# Patient Record
Sex: MALE | Race: OTHER | HISPANIC OR LATINO | Employment: FULL TIME | URBAN - METROPOLITAN AREA
[De-identification: names, ages, dates, MRNs, and addresses within clinical notes are randomized per-mention and may not be internally consistent; named-entity substitution may affect disease eponyms.]

---

## 2021-05-18 ENCOUNTER — HOSPITAL ENCOUNTER (OUTPATIENT)
Dept: URGENT CARE | Facility: CLINIC | Age: 21
Discharge: HOME | End: 2021-05-18
Attending: NURSE PRACTITIONER

## 2023-01-21 ENCOUNTER — TELEPHONE (OUTPATIENT)
Dept: URGENT CARE | Facility: CLINIC | Age: 23
End: 2023-01-21

## 2023-01-21 PROCEDURE — 87529 HSV DNA AMP PROBE: CPT | Performed by: NURSE PRACTITIONER

## 2023-01-21 PROCEDURE — 87591 N.GONORRHOEAE DNA AMP PROB: CPT | Performed by: NURSE PRACTITIONER

## 2023-01-21 PROCEDURE — 87086 URINE CULTURE/COLONY COUNT: CPT | Performed by: NURSE PRACTITIONER

## 2023-01-21 PROCEDURE — 87491 CHLMYD TRACH DNA AMP PROBE: CPT | Performed by: NURSE PRACTITIONER

## 2023-01-21 NOTE — TELEPHONE ENCOUNTER
----- Message from WILLIE Ashley sent at 1/21/2023  4:55 PM EST -----  Please notify patient of negative gonorrhea and Chlamydia test results.

## 2023-01-22 ENCOUNTER — TELEPHONE (OUTPATIENT)
Dept: URGENT CARE | Facility: CLINIC | Age: 23
End: 2023-01-22
Payer: COMMERCIAL

## 2023-01-22 NOTE — TELEPHONE ENCOUNTER
----- Message from WILLIE Gonzalez sent at 1/22/2023  5:28 PM EST -----  Please call the patient regarding his normal result.    Please let patient know that his urine culture showed no growth which is negative for urinary tract infection.    If he continues to have symptoms or any other concerns persist he should follow-up with a primary care provider.

## 2023-01-23 ENCOUNTER — TELEPHONE (OUTPATIENT)
Dept: URGENT CARE | Facility: CLINIC | Age: 23
End: 2023-01-23
Payer: COMMERCIAL

## 2023-01-23 NOTE — TELEPHONE ENCOUNTER
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

## 2023-02-17 ENCOUNTER — OFFICE VISIT (OUTPATIENT)
Dept: FAMILY MEDICINE CLINIC | Facility: CLINIC | Age: 23
End: 2023-02-17
Payer: COMMERCIAL

## 2023-02-17 ENCOUNTER — LAB (OUTPATIENT)
Dept: LAB | Facility: HOSPITAL | Age: 23
End: 2023-02-17
Payer: COMMERCIAL

## 2023-02-17 VITALS
SYSTOLIC BLOOD PRESSURE: 132 MMHG | HEART RATE: 81 BPM | OXYGEN SATURATION: 95 % | WEIGHT: 146 LBS | BODY MASS INDEX: 24.3 KG/M2 | DIASTOLIC BLOOD PRESSURE: 66 MMHG

## 2023-02-17 DIAGNOSIS — Z13.29 SCREENING FOR THYROID DISORDER: ICD-10-CM

## 2023-02-17 DIAGNOSIS — Z13.6 ENCOUNTER FOR LIPID SCREENING FOR CARDIOVASCULAR DISEASE: ICD-10-CM

## 2023-02-17 DIAGNOSIS — Z13.220 ENCOUNTER FOR LIPID SCREENING FOR CARDIOVASCULAR DISEASE: ICD-10-CM

## 2023-02-17 DIAGNOSIS — Z13.29 SCREENING FOR THYROID DISORDER: Primary | ICD-10-CM

## 2023-02-17 DIAGNOSIS — Z01.89 ROUTINE LAB DRAW: ICD-10-CM

## 2023-02-17 DIAGNOSIS — B00.9 HSV-2 INFECTION: ICD-10-CM

## 2023-02-17 LAB
ALBUMIN SERPL-MCNC: 4.6 G/DL (ref 3.5–5.2)
ALBUMIN/GLOB SERPL: 1.8 G/DL
ALP SERPL-CCNC: 101 U/L (ref 39–117)
ALT SERPL W P-5'-P-CCNC: 34 U/L (ref 1–41)
ANION GAP SERPL CALCULATED.3IONS-SCNC: 7 MMOL/L (ref 5–15)
AST SERPL-CCNC: 29 U/L (ref 1–40)
BASOPHILS # BLD AUTO: 0.04 10*3/MM3 (ref 0–0.2)
BASOPHILS NFR BLD AUTO: 0.4 % (ref 0–1.5)
BILIRUB SERPL-MCNC: 0.2 MG/DL (ref 0–1.2)
BUN SERPL-MCNC: 13 MG/DL (ref 6–20)
BUN/CREAT SERPL: 14.3 (ref 7–25)
CALCIUM SPEC-SCNC: 9.4 MG/DL (ref 8.6–10.5)
CHLORIDE SERPL-SCNC: 104 MMOL/L (ref 98–107)
CHOLEST SERPL-MCNC: 162 MG/DL (ref 0–200)
CO2 SERPL-SCNC: 29 MMOL/L (ref 22–29)
CREAT SERPL-MCNC: 0.91 MG/DL (ref 0.76–1.27)
DEPRECATED RDW RBC AUTO: 40.2 FL (ref 37–54)
EGFRCR SERPLBLD CKD-EPI 2021: 121.5 ML/MIN/1.73
EOSINOPHIL # BLD AUTO: 0.15 10*3/MM3 (ref 0–0.4)
EOSINOPHIL NFR BLD AUTO: 1.5 % (ref 0.3–6.2)
ERYTHROCYTE [DISTWIDTH] IN BLOOD BY AUTOMATED COUNT: 12.7 % (ref 12.3–15.4)
GLOBULIN UR ELPH-MCNC: 2.5 GM/DL
GLUCOSE SERPL-MCNC: 94 MG/DL (ref 65–99)
HCT VFR BLD AUTO: 42.9 % (ref 37.5–51)
HDLC SERPL-MCNC: 41 MG/DL (ref 40–60)
HGB BLD-MCNC: 15.2 G/DL (ref 13–17.7)
IMM GRANULOCYTES # BLD AUTO: 0.05 10*3/MM3 (ref 0–0.05)
IMM GRANULOCYTES NFR BLD AUTO: 0.5 % (ref 0–0.5)
LDLC SERPL CALC-MCNC: 86 MG/DL (ref 0–100)
LDLC/HDLC SERPL: 1.95 {RATIO}
LYMPHOCYTES # BLD AUTO: 2.11 10*3/MM3 (ref 0.7–3.1)
LYMPHOCYTES NFR BLD AUTO: 21.2 % (ref 19.6–45.3)
MCH RBC QN AUTO: 31 PG (ref 26.6–33)
MCHC RBC AUTO-ENTMCNC: 35.4 G/DL (ref 31.5–35.7)
MCV RBC AUTO: 87.4 FL (ref 79–97)
MONOCYTES # BLD AUTO: 0.71 10*3/MM3 (ref 0.1–0.9)
MONOCYTES NFR BLD AUTO: 7.1 % (ref 5–12)
NEUTROPHILS NFR BLD AUTO: 6.89 10*3/MM3 (ref 1.7–7)
NEUTROPHILS NFR BLD AUTO: 69.3 % (ref 42.7–76)
NRBC BLD AUTO-RTO: 0 /100 WBC (ref 0–0.2)
PLATELET # BLD AUTO: 299 10*3/MM3 (ref 140–450)
PMV BLD AUTO: 11.1 FL (ref 6–12)
POTASSIUM SERPL-SCNC: 3.8 MMOL/L (ref 3.5–5.2)
PROT SERPL-MCNC: 7.1 G/DL (ref 6–8.5)
RBC # BLD AUTO: 4.91 10*6/MM3 (ref 4.14–5.8)
SODIUM SERPL-SCNC: 140 MMOL/L (ref 136–145)
TRIGL SERPL-MCNC: 206 MG/DL (ref 0–150)
TSH SERPL DL<=0.05 MIU/L-ACNC: 0.97 UIU/ML (ref 0.27–4.2)
VLDLC SERPL-MCNC: 35 MG/DL (ref 5–40)
WBC NRBC COR # BLD: 9.95 10*3/MM3 (ref 3.4–10.8)

## 2023-02-17 PROCEDURE — 85025 COMPLETE CBC W/AUTO DIFF WBC: CPT

## 2023-02-17 PROCEDURE — 80061 LIPID PANEL: CPT

## 2023-02-17 PROCEDURE — 84443 ASSAY THYROID STIM HORMONE: CPT

## 2023-02-17 PROCEDURE — 80053 COMPREHEN METABOLIC PANEL: CPT

## 2023-02-17 PROCEDURE — 36415 COLL VENOUS BLD VENIPUNCTURE: CPT

## 2023-02-17 PROCEDURE — 99385 PREV VISIT NEW AGE 18-39: CPT

## 2023-02-17 RX ORDER — VALACYCLOVIR HYDROCHLORIDE 500 MG/1
500 TABLET, FILM COATED ORAL 2 TIMES DAILY
Qty: 10 TABLET | Refills: 0 | Status: SHIPPED | OUTPATIENT
Start: 2023-02-17 | End: 2023-02-22

## 2023-02-20 NOTE — PROGRESS NOTES
"Chief Complaint  Annual Exam (Patient states he has not been to see a provider since he was 17)    SUBJECTIVE  Trav Salmeron presents to [facility]    History of Present Illness  22-year-old Trav Salmeron presents today to establish care and for a annual physical.    Patient states that he is following up from an urgent care visit where he was diagnosed with herpes simplex 2.  Patient states that he still has a nikolas from a lesion on his penis that he is concerned about.  Is not hurting or itching at this time.  Patient would like to have a refill on antiviral medicine to help when symptoms do present again.  He wanted to discuss options for sexual intimacy.  States that he is currently monogamous with a girlfriend and wanted to know the chances of transmitting herpes to her.  We did discuss that it is important to disclose this information to her to give her the choice.  Patient states he understands.  He has no other concerns at this time.    Past Medical History:   Diagnosis Date   • Allergies       History reviewed. No pertinent family history.   History reviewed. No pertinent surgical history.     Current Outpatient Medications:   •  valACYclovir (Valtrex) 500 MG tablet, Take 1 tablet by mouth 2 (Two) Times a Day for 5 days., Disp: 10 tablet, Rfl: 0    OBJECTIVE  Vital Signs:   /66 (BP Location: Left arm)   Pulse 81   Wt 66.2 kg (146 lb)   SpO2 95%   BMI 24.30 kg/m²    Estimated body mass index is 24.3 kg/m² as calculated from the following:    Height as of 1/21/23: 165.1 cm (65\").    Weight as of this encounter: 66.2 kg (146 lb).     Wt Readings from Last 3 Encounters:   02/17/23 66.2 kg (146 lb)   01/21/23 64 kg (141 lb)   01/01/22 59.6 kg (131 lb 4.8 oz)     BP Readings from Last 3 Encounters:   02/17/23 132/66   01/21/23 127/71   01/01/22 116/61       Physical Exam  Vitals and nursing note reviewed.   Constitutional:       Appearance: Normal appearance. "   HENT:      Head: Normocephalic and atraumatic.      Nose: Nose normal.      Mouth/Throat:      Mouth: Mucous membranes are moist.   Eyes:      Conjunctiva/sclera: Conjunctivae normal.      Pupils: Pupils are equal, round, and reactive to light.   Cardiovascular:      Rate and Rhythm: Normal rate and regular rhythm.      Pulses: Normal pulses.      Heart sounds: Normal heart sounds.   Pulmonary:      Effort: Pulmonary effort is normal.      Breath sounds: Normal breath sounds.   Abdominal:      General: Abdomen is flat.      Palpations: Abdomen is soft.   Musculoskeletal:         General: Normal range of motion.      Cervical back: Normal range of motion and neck supple.   Skin:     General: Skin is warm and dry.   Neurological:      General: No focal deficit present.      Mental Status: He is alert and oriented to person, place, and time. Mental status is at baseline.   Psychiatric:         Mood and Affect: Mood normal.         Behavior: Behavior normal.         Thought Content: Thought content normal.         Judgment: Judgment normal.          Result Review    Common labs    Common Labs 2/17/23 2/17/23 2/17/23    1623 1623 1623   Glucose  94    BUN  13    Creatinine  0.91    Sodium  140    Potassium  3.8    Chloride  104    Calcium  9.4    Albumin  4.6    Total Bilirubin  0.2    Alkaline Phosphatase  101    AST (SGOT)  29    ALT (SGPT)  34    WBC 9.95     Hemoglobin 15.2     Hematocrit 42.9     Platelets 299     Total Cholesterol   162   Triglycerides   206 (A)   HDL Cholesterol   41   LDL Cholesterol    86   (A) Abnormal value            CMP    CMP 2/17/23   Glucose 94   BUN 13   Creatinine 0.91   eGFR 121.5   Sodium 140   Potassium 3.8   Chloride 104   Calcium 9.4   Total Protein 7.1   Albumin 4.6   Globulin 2.5   Total Bilirubin 0.2   Alkaline Phosphatase 101   AST (SGOT) 29   ALT (SGPT) 34   Albumin/Globulin Ratio 1.8   BUN/Creatinine Ratio 14.3   Anion Gap 7.0           CBC    CBC 2/17/23   WBC 9.95   RBC  4.91   Hemoglobin 15.2   Hematocrit 42.9   MCV 87.4   MCH 31.0   MCHC 35.4   RDW 12.7   Platelets 299           CBC w/diff    CBC w/Diff 2/17/23   WBC 9.95   RBC 4.91   Hemoglobin 15.2   Hematocrit 42.9   MCV 87.4   MCH 31.0   MCHC 35.4   RDW 12.7   Platelets 299   Neutrophil Rel % 69.3   Immature Granulocyte Rel % 0.5   Lymphocyte Rel % 21.2   Monocyte Rel % 7.1   Eosinophil Rel % 1.5   Basophil Rel % 0.4           Lipid Panel    Lipid Panel 2/17/23   Total Cholesterol 162   Triglycerides 206 (A)   HDL Cholesterol 41   VLDL Cholesterol 35   LDL Cholesterol  86   LDL/HDL Ratio 1.95   (A) Abnormal value            TSH    TSH 2/17/23   TSH 0.974           Electrolytes    Electrolytes 2/17/23   Sodium 140   Potassium 3.8   Chloride 104   Calcium 9.4           Renal Profile    Renal Profile 2/17/23   BUN 13   Creatinine 0.91           BMP    BMP 2/17/23   BUN 13   Creatinine 0.91   Sodium 140   Potassium 3.8   Chloride 104   CO2 29.0   Calcium 9.4                           Lab Results   Component Value Date    COLORU Orange (A) 01/21/2023    CLARITYU Cloudy (A) 01/21/2023    PHUR 7.0 01/21/2023    KETONESU Negative 01/21/2023    BILIRUBINUR Negative 01/21/2023    LEUKOCYTESUR Negative 01/21/2023    UROBILINOGEN 1 E.U./dL (A) 01/21/2023       No Images in the past 120 days found..      The above data has been reviewed by WILLIE Villegas 02/17/2023 10:09 EST.          Patient Care Team:  Katey Dickinson APRN as PCP - General (Emergency Medicine)    BMI is within normal parameters. No other follow-up for BMI required.       ASSESSMENT & PLAN    Diagnoses and all orders for this visit:    1. Screening for thyroid disorder (Primary)  Comments:  He will have done with labs.  Orders:  -     TSH; Future    2. HSV-2 infection  Comments:  I have ordered the patient Valtrex.    3. Encounter for lipid screening for cardiovascular disease  Comments:  He will have done with labs.  Orders:  -     Lipid Panel; Future    4.  Routine lab draw  -     Comprehensive Metabolic Panel; Future  -     CBC & Differential; Future    Other orders  -     valACYclovir (Valtrex) 500 MG tablet; Take 1 tablet by mouth 2 (Two) Times a Day for 5 days.  Dispense: 10 tablet; Refill: 0         Tobacco Use: Low Risk    • Smoking Tobacco Use: Never   • Smokeless Tobacco Use: Never   • Passive Exposure: Never       Follow Up     No follow-ups on file.      Patient was given instructions and counseling regarding his condition or for health maintenance advice. Please see specific information pulled into the AVS if appropriate.   I have reviewed information obtained and documented by others and I have confirmed the accuracy of this documented note.    WILLIE Villegas   Trav Salmeron is a 22 y.o. male and is here for a comprehensive physical exam. The patient reports problems - bumps on penis. .    Do you take any herbs or supplements that were not prescribed by a doctor? no  Are you taking calcium supplements? no  Are you taking aspirin daily? no     History:  Patient receives prostate care outside our clinic  Date last prostate exam: none  Date last PSA: none    The following portions of the patient's history were reviewed and updated as appropriate: allergies, current medications, past family history, past medical history, past social history, past surgical history and problem list.    Review of Systems  Do you have pain that bothers you in your daily life? no  A comprehensive review of systems was negative.    Objective   /66 (BP Location: Left arm)   Pulse 81   Wt 66.2 kg (146 lb)   SpO2 95%   BMI 24.30 kg/m²     General Appearance:    Alert, cooperative, no distress, appears stated age   Head:    Normocephalic, without obvious abnormality, atraumatic   Eyes:    PERRL, conjunctiva/corneas clear, EOM's intact, fundi     benign, both eyes        Ears:    Normal TM's and external ear canals, both ears   Nose:   Nares  normal, septum midline, mucosa normal, no drainage    or sinus tenderness   Throat:   Lips, mucosa, and tongue normal; teeth and gums normal   Neck:   Supple, symmetrical, trachea midline, no adenopathy;        thyroid:  No enlargement/tenderness/nodules; no carotid    bruit or JVD   Back:     Symmetric, no curvature, ROM normal, no CVA tenderness   Lungs:     Clear to auscultation bilaterally, respirations unlabored   Chest wall:    No tenderness or deformity   Heart:    Regular rate and rhythm, S1 and S2 normal, no murmur, rub   or gallop   Abdomen:     Soft, non-tender, bowel sounds active all four quadrants,     no masses, no organomegaly   Genitalia:    Normal male without lesion, discharge or tenderness   Rectal:    Normal tone, normal prostate, no masses or tenderness;    guaiac negative stool   Extremities:   Extremities normal, atraumatic, no cyanosis or edema   Pulses:   2+ and symmetric all extremities   Skin:   Skin color, texture, turgor normal, no rashes or lesions   Lymph nodes:   Cervical, supraclavicular, and axillary nodes normal   Neurologic:   CNII-XII intact. Normal strength, sensation and reflexes       throughout           History reviewed. No pertinent surgical history.   History reviewed. No pertinent family history.     Current Outpatient Medications:   •  valACYclovir (Valtrex) 500 MG tablet, Take 1 tablet by mouth 2 (Two) Times a Day for 5 days., Disp: 10 tablet, Rfl: 0   Assessment and Plan  Diagnoses and all orders for this visit:    1. Screening for thyroid disorder (Primary)  Comments:  He will have done with labs.  Orders:  -     TSH; Future    2. HSV-2 infection  Comments:  I have ordered the patient Valtrex.    3. Encounter for lipid screening for cardiovascular disease  Comments:  He will have done with labs.  Orders:  -     Lipid Panel; Future    4. Routine lab draw  -     Comprehensive Metabolic Panel; Future  -     CBC & Differential; Future    Other orders  -     valACYclovir  (Valtrex) 500 MG tablet; Take 1 tablet by mouth 2 (Two) Times a Day for 5 days.  Dispense: 10 tablet; Refill: 0      Healthy male exam.   Screening for thyroid disorder [Z13.29]     1.   2. Patient Counseling:  --Nutrition: Stressed importance of moderation in sodium/caffeine intake, saturated fat and cholesterol, caloric balance, sufficient intake of fresh fruits, vegetables, fiber, calcium, iron, and 1 mg of folate supplement per day (for females capable of pregnancy).  --Discussed the issue of estrogen replacement, calcium supplement, and the daily use of baby aspirin.  --Exercise: Stressed the importance of regular exercise.   --Substance Abuse: Discussed cessation/primary prevention of tobacco, alcohol, or other drug use; driving or other dangerous activities under the influence; availability of treatment for abuse.    --Sexuality: Discussed sexually transmitted diseases, partner selection, use of condoms, avoidance of unintended pregnancy  and contraceptive alternatives.   --Injury prevention: Discussed safety belts, safety helmets, smoke detector, smoking near bedding or upholstery.   --Dental health: Discussed importance of regular tooth brushing, flossing, and dental visits.  --Immunizations reviewed.      3. Discussed the patient's BMI with him.  The BMI is in the acceptable range  4. Follow up in one year      The patient is advised to reduce exposure to stress, continue current medications, continue current healthy lifestyle patterns and return for routine annual checkups.

## 2023-02-20 NOTE — PROGRESS NOTES
Triglycerides are elevated at 206. This can be reduced with diet and exercise.  No other labs concerning at this time.

## 2023-02-24 ENCOUNTER — TELEPHONE (OUTPATIENT)
Dept: URGENT CARE | Facility: CLINIC | Age: 23
End: 2023-02-24
Payer: COMMERCIAL

## 2023-02-24 NOTE — TELEPHONE ENCOUNTER
Caller: Trav Leach    Relationship: Self    Best call back number: [phone]    What is the best time to reach you: ANY    Who are you requesting to speak with (clinical staff, provider,  specific staff member): CLINICAL    What was the call regarding: PATIENT STATED THAT HE HAS COMPLETED MEDICATION FOR HPV1 AND IS HAVING ANOTHER BREAK OUT IN THE LAST 24 HOURS,  HE IS REQUESTING CALL TO DISCUSS PRIOR TO TAKING MEDICATION AGAIN.     Do you require a callback: YES

## 2023-02-27 DIAGNOSIS — B00.9 HSV-2 (HERPES SIMPLEX VIRUS 2) INFECTION: Primary | ICD-10-CM

## 2023-02-27 RX ORDER — ACYCLOVIR 400 MG/1
400 TABLET ORAL
Qty: 50 TABLET | Refills: 5 | Status: SHIPPED | OUTPATIENT
Start: 2023-02-27 | End: 2023-03-09

## 2023-11-21 ENCOUNTER — OFFICE VISIT (OUTPATIENT)
Dept: FAMILY MEDICINE CLINIC | Facility: CLINIC | Age: 23
End: 2023-11-21
Payer: COMMERCIAL

## 2023-11-21 VITALS
HEART RATE: 91 BPM | OXYGEN SATURATION: 97 % | BODY MASS INDEX: 26.42 KG/M2 | SYSTOLIC BLOOD PRESSURE: 109 MMHG | HEIGHT: 65 IN | WEIGHT: 158.6 LBS | DIASTOLIC BLOOD PRESSURE: 55 MMHG

## 2023-11-21 DIAGNOSIS — Z76.89 ESTABLISHING CARE WITH NEW DOCTOR, ENCOUNTER FOR: Primary | ICD-10-CM

## 2023-11-21 DIAGNOSIS — Z23 NEED FOR INFLUENZA VACCINATION: ICD-10-CM

## 2023-11-21 RX ORDER — ERYTHROMYCIN 5 MG/G
OINTMENT OPHTHALMIC
COMMUNITY
End: 2023-11-21

## 2023-11-21 NOTE — PROGRESS NOTES
"    Subjective:       Trav Salmeron is a 23 y.o. male who presents to [facility].     Mr. Salmeron is otherwise healthy and has no complaints today.     We discussed care gaps and he will receive a flu shot today.     Can follow up next year for next physical.     The following portions of the patient's history were reviewed and updated as appropriate: allergies, current medications, past family history, past medical history, past social history, past surgical history, and problem list.    Past Medical Hx:  Past Medical History:   Diagnosis Date    Allergies        Past Surgical Hx:  History reviewed. No pertinent surgical history.    Current Meds:  No current outpatient medications on file.    Allergies:  No Known Allergies    Family Hx:  History reviewed. No pertinent family history.     Social History:  Social History     Socioeconomic History    Marital status: Single   Tobacco Use    Smoking status: Never     Passive exposure: Never    Smokeless tobacco: Never   Vaping Use    Vaping Use: Never used   Substance and Sexual Activity    Alcohol use: Never    Drug use: Never    Sexual activity: Never       Review of Systems  Review of Systems   Constitutional:  Negative for chills.   Respiratory:  Negative for shortness of breath.    Gastrointestinal:  Negative for nausea and vomiting.   Genitourinary:  Negative for dysuria and genital sores.   Neurological:  Negative for dizziness, weakness and light-headedness.       Objective:     /55   Pulse 91   Ht 165.1 cm (65\")   Wt 71.9 kg (158 lb 9.6 oz)   SpO2 97%   BMI 26.39 kg/m²   Physical Exam  Constitutional:       General: He is not in acute distress.     Appearance: Normal appearance. He is not ill-appearing, toxic-appearing or diaphoretic.   HENT:      Head: Normocephalic and atraumatic.   Eyes:      Extraocular Movements: Extraocular movements intact.      Pupils: Pupils are equal, round, and reactive to light.   Cardiovascular:      Rate and " Rhythm: Normal rate and regular rhythm.   Pulmonary:      Effort: Pulmonary effort is normal. No respiratory distress.      Breath sounds: Normal breath sounds. No stridor. No wheezing, rhonchi or rales.   Abdominal:      General: Bowel sounds are normal. There is no distension.      Tenderness: There is no abdominal tenderness.   Musculoskeletal:      Cervical back: Normal range of motion and neck supple.   Lymphadenopathy:      Cervical: No cervical adenopathy.   Skin:     General: Skin is warm and dry.      Coloration: Skin is not jaundiced.   Neurological:      Mental Status: He is alert.      Motor: No weakness.   Psychiatric:         Mood and Affect: Mood normal.         Behavior: Behavior normal.          Assessment/Plan:     Diagnoses and all orders for this visit:    1. Establishing care with new doctor, encounter for (Primary)    Mr. Salmeron is otherwise healthy and has no complaints today.       Can follow up next year for next physical.     2. Need for influenza vaccination    We discussed care gaps and he will receive a flu shot today.     -     Fluzone >6 Months (2622-7811)          Rx changes: None    Follow-up:     Return in about 1 year (around 11/21/2024) for Annual physical.    Preventative:  Health Maintenance   Topic Date Due    COVID-19 Vaccine (1) Never done    TDAP/TD VACCINES (1 - Tdap) Never done    HEPATITIS C SCREENING  Never done    ANNUAL PHYSICAL  Never done    INFLUENZA VACCINE  Never done    Pneumococcal Vaccine 0-64  Aged Out           This document has been electronically signed by Ti Duarte MD on November 21, 2023 15:18 EST       Parts of this note are electronic transcriptions/translations of spoken language to printed text using the Dragon Dictation system.

## 2024-01-22 RX ORDER — VALACYCLOVIR HYDROCHLORIDE 500 MG/1
TABLET, FILM COATED ORAL
Qty: 10 TABLET | Refills: 0 | Status: SHIPPED | OUTPATIENT
Start: 2024-01-22

## 2024-01-22 NOTE — TELEPHONE ENCOUNTER
Caller: Trav Leach    Relationship: Self    Best call back number: [phone]     Who are you requesting to speak with (clinical staff, provider,  specific staff member): DR GARZA    What was the call regarding: PATIENT STATES THAT  HE WOULD LIKE FOR THIS PRESCRIPTION TO BE FILLED BY DR GARZA      PATIENT STATES THAT HE WOULD LIKE A CALL OR TEXT TO LET HIM KNOW IF THIS WILL BE FILLED.

## 2024-01-22 NOTE — TELEPHONE ENCOUNTER
Previous CT patient, established care with KR on 11/21/23.  Next f/u appt 11/22/24  Last Rx per [facility] 2/17/23 for #20/0

## 2024-01-22 NOTE — TELEPHONE ENCOUNTER
I called patient:  He states this is his second flare since his diagnosis over a year ago and is comfortable staying with current treatment plan.  I encouraged patient to contact office for increase to daily therapy if he started having more frequent flares/outbreaks.  Patient states understanding.

## 2024-05-30 RX ORDER — VALACYCLOVIR HYDROCHLORIDE 500 MG/1
500 TABLET, FILM COATED ORAL DAILY
Qty: 10 TABLET | Refills: 0 | Status: SHIPPED | OUTPATIENT
Start: 2024-05-30

## 2024-05-30 NOTE — TELEPHONE ENCOUNTER
Caller: Trav Leach    Relationship: Self    Best call back number: [phone]     Requested Prescriptions:   Requested Prescriptions     Pending Prescriptions Disp Refills    valACYclovir (VALTREX) 500 MG tablet 10 tablet 0        Pharmacy where request should be sent: [id number] - [place], [place] - [address] AT [facility] [phone] [facility] [phone]      Last office visit with prescribing clinician: 11/21/2023   Last telemedicine visit with prescribing clinician: Visit date not found   Next office visit with prescribing clinician: 11/22/2024     Additional details provided by patient: PATIENT IS COMPLETELY OUT OF MEDICATION. PATIENT IS REQUESTING A DAILY DOSE PRESCRIPTION.     Does the patient have less than a 3 day supply:  [x] Yes  [] No    Would you like a call back once the refill request has been completed: [x] Yes [] No    If the office needs to give you a call back, can they leave a voicemail: [x] Yes [] No    Kevin Mccoy Rep   05/30/24 14:49 EDT

## 2024-08-13 ENCOUNTER — TELEPHONE (OUTPATIENT)
Dept: FAMILY MEDICINE CLINIC | Facility: CLINIC | Age: 24
End: 2024-08-13
Payer: COMMERCIAL

## 2024-08-13 NOTE — TELEPHONE ENCOUNTER
Called patient: he is requesting psych referral to Monet Jiménez for anxiety and depression.  Patient established care 11/21/23, has not been seen in the clinic since then and has no follow up appt scheduled.  There is no mention of anxiety or depression in that first office note.  Would you like to have patient schedule appt before getting psych referral?

## 2024-08-13 NOTE — TELEPHONE ENCOUNTER
Patient called and is wondering if he could get a referral to Monet Jiménez. The phone number is [phone]. Her location is [address] [place] [address] [place] [address]

## 2024-08-15 DIAGNOSIS — F41.9 ANXIETY AND DEPRESSION: Primary | ICD-10-CM

## 2024-08-15 DIAGNOSIS — F32.A ANXIETY AND DEPRESSION: Primary | ICD-10-CM

## 2024-08-15 NOTE — TELEPHONE ENCOUNTER
This is what I would recommend: Before scheduling an appointment, I would just like to personally talk with the patient and ask about his history of depression and anxiety.  I would feel comfortable to treat this with medication if he would prefer to avoid seeing a specialist.  However, if he specifically wants to be seen first by psych I can of course refer him but I am also comfortable to treat him as well if that is what he prefers.  Because we have not had an opportunity to talk about any of this history before, I would like to talk to them personally to find out more about this.  However, this does not have to be a video visit.  We could simply be scheduled for a time to speak to each other over the phone.  Can we reach out to the patient and set up a time for us to speak together?  I tried to call him personally this morning and was not able to get through as it went straight to voicemail.    Thank you very much,    Ti Duarte      This document has been electronically signed by Ti Duarte MD on August 15, 2024 09:39 EDT

## 2024-08-15 NOTE — TELEPHONE ENCOUNTER
Dr. Duarte - please call patient around 4:00 today      - please cancel appt because he is just going to call the patient, not do a visit.

## 2024-08-15 NOTE — TELEPHONE ENCOUNTER
I called the patient back and spoke with him personally over the phone for several minutes.  Lately, he has been dealing with depression and anxiety.  He declines any medical therapy for me for this and prefers counseling only.  He would specifically like to see psychiatrist Monet Jiménez because he has some friends who have seen her and had very good results.  I told him I would be happy to treat him for depression and anxiety if he wishes and have a visit for this purpose but if it is his preference for referral I can also place that as well for cognitive behavioral therapy.  He expressed a preference to avoid medication for me and to be referred to Monet Jiménez today.  I have placed this referral today.      This document has been electronically signed by Ti Duarte MD on August 15, 2024 16:07 EDT

## 2024-08-15 NOTE — TELEPHONE ENCOUNTER
Patient is scheduled today @ 4 pm. He would prefer to have a telephone call not a MyChart Video Visit

## 2024-08-29 RX ORDER — VALACYCLOVIR HYDROCHLORIDE 500 MG/1
500 TABLET, FILM COATED ORAL DAILY
Qty: 10 TABLET | Refills: 3 | Status: SHIPPED | OUTPATIENT
Start: 2024-08-29

## 2024-08-29 NOTE — TELEPHONE ENCOUNTER
Caller: Trav Leach    Relationship: Self    Best call back number: [phone]    Requested Prescriptions:   Requested Prescriptions     Pending Prescriptions Disp Refills    valACYclovir (VALTREX) 500 MG tablet 10 tablet 0     Sig: Take 1 tablet by mouth Daily.        Pharmacy where request should be sent: [id number] - [place], [place] - [address] AT [facility] [phone] [facility] [phone]      Last office visit with prescribing clinician: 11/21/2023   Last telemedicine visit with prescribing clinician: Visit date not found   Next office visit with prescribing clinician: 11/22/2024     Additional details provided by patient: PATIENT IS CURRENTLY OUT OF THIS MEDICATION.     Does the patient have less than a 3 day supply:  [x] Yes  [] No    Would you like a call back once the refill request has been completed: [] Yes [x] No    If the office needs to give you a call back, can they leave a voicemail: [] Yes [x] No    Kevin Bailey Rep   08/29/24 13:31 EDT

## 2024-09-30 ENCOUNTER — OFFICE VISIT (OUTPATIENT)
Dept: FAMILY MEDICINE CLINIC | Facility: CLINIC | Age: 24
End: 2024-09-30
Payer: COMMERCIAL

## 2024-09-30 ENCOUNTER — LAB (OUTPATIENT)
Dept: LAB | Facility: HOSPITAL | Age: 24
End: 2024-09-30
Payer: COMMERCIAL

## 2024-09-30 VITALS
OXYGEN SATURATION: 96 % | HEIGHT: 65 IN | BODY MASS INDEX: 25.66 KG/M2 | TEMPERATURE: 99.5 F | WEIGHT: 154 LBS | HEART RATE: 115 BPM | SYSTOLIC BLOOD PRESSURE: 150 MMHG | DIASTOLIC BLOOD PRESSURE: 88 MMHG

## 2024-09-30 DIAGNOSIS — R05.9 COUGH, UNSPECIFIED TYPE: ICD-10-CM

## 2024-09-30 DIAGNOSIS — R50.9 FEVER, UNSPECIFIED FEVER CAUSE: ICD-10-CM

## 2024-09-30 DIAGNOSIS — Z20.2 POTENTIAL EXPOSURE TO STD: Primary | ICD-10-CM

## 2024-09-30 DIAGNOSIS — R45.89 DEPRESSED MOOD: ICD-10-CM

## 2024-09-30 DIAGNOSIS — Z20.2 POTENTIAL EXPOSURE TO STD: ICD-10-CM

## 2024-09-30 DIAGNOSIS — G44.83 PRIMARY COUGH HEADACHE: ICD-10-CM

## 2024-09-30 DIAGNOSIS — J02.9 SORE THROAT: ICD-10-CM

## 2024-09-30 LAB
EXPIRATION DATE: NORMAL
EXPIRATION DATE: NORMAL
FLUAV AG UPPER RESP QL IA.RAPID: NOT DETECTED
FLUBV AG UPPER RESP QL IA.RAPID: NOT DETECTED
HCV AB SER QL: NORMAL
HETEROPH AB SER QL LA: NEGATIVE
HIV 1+2 AB+HIV1 P24 AG SERPL QL IA: NORMAL
INTERNAL CONTROL: NORMAL
INTERNAL CONTROL: NORMAL
Lab: NORMAL
Lab: NORMAL
S PYO AG THROAT QL: NEGATIVE
SARS-COV-2 AG UPPER RESP QL IA.RAPID: NOT DETECTED

## 2024-09-30 PROCEDURE — 36415 COLL VENOUS BLD VENIPUNCTURE: CPT

## 2024-09-30 PROCEDURE — 86592 SYPHILIS TEST NON-TREP QUAL: CPT

## 2024-09-30 PROCEDURE — 99213 OFFICE O/P EST LOW 20 MIN: CPT | Performed by: STUDENT IN AN ORGANIZED HEALTH CARE EDUCATION/TRAINING PROGRAM

## 2024-09-30 PROCEDURE — 87880 STREP A ASSAY W/OPTIC: CPT | Performed by: STUDENT IN AN ORGANIZED HEALTH CARE EDUCATION/TRAINING PROGRAM

## 2024-09-30 PROCEDURE — 86308 HETEROPHILE ANTIBODY SCREEN: CPT

## 2024-09-30 PROCEDURE — G0432 EIA HIV-1/HIV-2 SCREEN: HCPCS

## 2024-09-30 PROCEDURE — 87428 SARSCOV & INF VIR A&B AG IA: CPT | Performed by: STUDENT IN AN ORGANIZED HEALTH CARE EDUCATION/TRAINING PROGRAM

## 2024-09-30 PROCEDURE — 86803 HEPATITIS C AB TEST: CPT

## 2024-09-30 NOTE — PROGRESS NOTES
Subjective:       Trav Salmeron is a 24 y.o. male who presents for potential exposure to STD.     Mr. Salmeron endorses nonspecific symptoms of headache and sore throat for approximately one month.     Due to his current symptoms, he was tested for COVID and Flu as well as strep throat prior to our encounter. These tests were negative.           Mr. Salmeron tells me that he had a new sexual partner one month ago and did not use a condom. He has been reading about his symptoms and is concerned about the possibility he was exposed to an STD, such as HIV.     Mr. Salmeron is afebrile for us though his temperature is 99.5 F. His HR, while initially elevated at 115, improved on my physical exam, suggesting that this initial elevation might have been due to anxiousness. This would also fit with the elevation in his BP of 150/88, as his BP was normal at 116/67 at last visit. On physical exam, he is anxious appearing but not toxic appearing, and his lungs are clear to auscultation.     He requests testing for HIV and syphilis today, and I would also screen him for Hepatitis C. Would order a monospot as well and see back in six weeks.     Mr. Salmeron is also anxious and somber today. 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 That, coupled with his anxiousness over potential exposure to STD, has left him feeling anxious and depressed. We discussed treatment options today, including medications and counseling. He prefers to start with referral to Behavioral Health for counseling today.         Past Medical Hx:  Past Medical History:   Diagnosis Date    Allergies        Past Surgical Hx:  History reviewed. No pertinent surgical history.    Current Meds:    Current Outpatient Medications:     valACYclovir (VALTREX) 500 MG tablet, Take 1 tablet by mouth Daily., Disp: 10 tablet, Rfl: 3    erythromycin (ROMYCIN) 5 MG/GM ophthalmic ointment,  "Apply every 4 hours for the first 48 hours then go to every 6 hours for 5 more days (Patient not taking: Reported on 9/30/2024), Disp: 1 g, Rfl: 0    Allergies:  No Known Allergies    Family Hx:  History reviewed. No pertinent family history.     Social History:  Social History     Socioeconomic History    Marital status: Single   Tobacco Use    Smoking status: Never     Passive exposure: Never    Smokeless tobacco: Never   Vaping Use    Vaping status: Never Used   Substance and Sexual Activity    Alcohol use: Never    Drug use: Never    Sexual activity: Never       Review of Systems  Review of Systems   Constitutional:  Positive for fever.   HENT:  Positive for sore throat.    Respiratory:  Positive for cough.    Neurological:  Positive for headaches.       Objective:     /88   Pulse 115   Temp 99.5 °F (37.5 °C)   Ht 165.1 cm (65\")   Wt 69.9 kg (154 lb)   SpO2 96%   BMI 25.63 kg/m²   Physical Exam  Constitutional:       General: He is not in acute distress.     Appearance: Normal appearance. He is not ill-appearing, toxic-appearing or diaphoretic.   Cardiovascular:      Rate and Rhythm: Normal rate.   Pulmonary:      Effort: Pulmonary effort is normal.      Breath sounds: Normal breath sounds.   Lymphadenopathy:      Cervical: No cervical adenopathy.   Neurological:      Mental Status: He is alert.          Assessment/Plan:     Diagnoses and all orders for this visit:    1. Potential exposure to STD (Primary)    Mr. Salmeron endorses nonspecific symptoms of headache and sore throat for approximately one month.     Due to his current symptoms, he was tested for COVID and Flu as well as strep throat prior to our encounter. These tests were negative.           Mr. Salmeron tells me that he had a new sexual partner one month ago and did not use a condom. He has been reading about his symptoms and is concerned about the possibility he was exposed to an STD, such as HIV.     Mr. Salmeron is afebrile for us " though his temperature is 99.5 F. His HR, while initially elevated at 115, improved on my physical exam, suggesting that this initial elevation might have been due to anxiousness. This would also fit with the elevation in his BP of 150/88, as his BP was normal at 116/67 at last visit. On physical exam, he is anxious appearing but not toxic appearing, and his lungs are clear to auscultation.     He requests testing for HIV and syphilis today, and I would also screen him for Hepatitis C. Would order a monospot as well and see back in six weeks.       -     HIV-1/O/2 Ag/Ab w Reflex; Future  -     RPR; Future  -     Hepatitis C antibody; Future  -     Mononucleosis Screen; Future    2. Fever, unspecified fever cause  -     POCT SARS-CoV-2 Antigen DASHA + Flu  -     POCT rapid strep A    3. Cough, unspecified type  -     POCT SARS-CoV-2 Antigen DASHA + Flu  -     POCT rapid strep A    4. Primary cough headache  -     POCT SARS-CoV-2 Antigen DASHA + Flu  -     POCT rapid strep A    5. Sore throat  -     POCT SARS-CoV-2 Antigen DAHSA + Flu  -     POCT rapid strep A    6. Depressed mood      Mr. Salmeron is also anxious and somber today. 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 That, coupled with his anxiousness over potential exposure to STD, has left him feeling anxious and depressed. We discussed treatment options today, including medications and counseling. He prefers to start with referral to Behavioral Health for counseling today.     -     Ambulatory Referral to Behavioral Health                  Rx changes: None today    Follow-up:     Return in about 6 weeks (around 11/11/2024) for Recheck .    Preventative:  Health Maintenance   Topic Date Due    ANNUAL PHYSICAL  02/17/2024    INFLUENZA VACCINE  08/01/2024    COVID-19 Vaccine (1 - 2023-24 season) 10/01/2024 (Originally 9/1/2024)    TDAP/TD VACCINES (1 - Tdap) 10/01/2024 (Originally 4/10/2018)     BMI FOLLOWUP  09/30/2025    HEPATITIS C SCREENING  Completed    Pneumococcal Vaccine 0-64  Aged Out       This document has been electronically signed by Ti Duarte MD on September 30, 2024 23:12 EDT       Parts of this note are electronic transcriptions/translations of spoken language to printed text using the Dragon Dictation system.

## 2024-10-01 LAB — RPR SER QL: NORMAL

## 2024-10-01 NOTE — PROGRESS NOTES
HIV, Hep C, mono tests are negative. RPR in progress. Will discuss follow up testing at appointment in six weeks. If his symptoms change prior to that time, we can see him sooner.     Thank you,    Ti Duarte     ?  This document has been electronically signed by Ti Duarte MD on September 30, 2024 23:14 EDT

## 2024-10-03 NOTE — PROGRESS NOTES
RPR also nonreactive.    ?  This document has been electronically signed by Ti Duarte MD on October 3, 2024 07:42 EDT

## 2024-11-12 ENCOUNTER — OFFICE VISIT (OUTPATIENT)
Dept: FAMILY MEDICINE CLINIC | Facility: CLINIC | Age: 24
End: 2024-11-12
Payer: COMMERCIAL

## 2024-11-12 ENCOUNTER — LAB (OUTPATIENT)
Dept: LAB | Facility: HOSPITAL | Age: 24
End: 2024-11-12
Payer: COMMERCIAL

## 2024-11-12 VITALS
BODY MASS INDEX: 26.76 KG/M2 | HEIGHT: 65 IN | SYSTOLIC BLOOD PRESSURE: 126 MMHG | OXYGEN SATURATION: 97 % | WEIGHT: 160.6 LBS | HEART RATE: 78 BPM | DIASTOLIC BLOOD PRESSURE: 58 MMHG

## 2024-11-12 DIAGNOSIS — Z20.2 POTENTIAL EXPOSURE TO STD: ICD-10-CM

## 2024-11-12 DIAGNOSIS — Z23 NEEDS FLU SHOT: Primary | ICD-10-CM

## 2024-11-12 LAB — HIV 1+2 AB+HIV1 P24 AG SERPL QL IA: NORMAL

## 2024-11-12 PROCEDURE — G0432 EIA HIV-1/HIV-2 SCREEN: HCPCS

## 2024-11-12 PROCEDURE — 90656 IIV3 VACC NO PRSV 0.5 ML IM: CPT | Performed by: STUDENT IN AN ORGANIZED HEALTH CARE EDUCATION/TRAINING PROGRAM

## 2024-11-12 PROCEDURE — 36415 COLL VENOUS BLD VENIPUNCTURE: CPT

## 2024-11-12 PROCEDURE — 90471 IMMUNIZATION ADMIN: CPT | Performed by: STUDENT IN AN ORGANIZED HEALTH CARE EDUCATION/TRAINING PROGRAM

## 2024-11-12 PROCEDURE — 99213 OFFICE O/P EST LOW 20 MIN: CPT | Performed by: STUDENT IN AN ORGANIZED HEALTH CARE EDUCATION/TRAINING PROGRAM

## 2024-11-12 NOTE — PROGRESS NOTES
Subjective:       Trav Salmeron is a 24 y.o. male who presents for STD recheck.    See my last clinic note for further details.  He had been potentially exposed to STD and had symptoms including fatigue and sore throat.  I had ordered a full panel and requested to follow-up in approximately 6 weeks.  Labs were negative and we reviewed them today.  Would recheck HIV panel today.  He tells me symptoms have resolved so if this is negative, no further workup would be indicated.    Still having symptoms of anxiety.  He has upcoming appoint with behavioral health.  We did again discuss potential options including pharmacologic therapy but he is concerned about potential side effects we will hold off for the time being.    The following portions of the patient's history were reviewed and updated as appropriate: allergies, current medications, past family history, past medical history, past social history, past surgical history, and problem list.    Past Medical Hx:  Past Medical History:   Diagnosis Date    Allergies        Past Surgical Hx:  No past surgical history on file.    Current Meds:    Current Outpatient Medications:     valACYclovir (VALTREX) 500 MG tablet, Take 1 tablet by mouth Daily., Disp: 10 tablet, Rfl: 3    erythromycin (ROMYCIN) 5 MG/GM ophthalmic ointment, Apply every 4 hours for the first 48 hours then go to every 6 hours for 5 more days (Patient not taking: Reported on 11/12/2024), Disp: 1 g, Rfl: 0    Allergies:  No Known Allergies    Family Hx:  History reviewed. No pertinent family history.     Social History:  Social History     Socioeconomic History    Marital status: Single   Tobacco Use    Smoking status: Never     Passive exposure: Never    Smokeless tobacco: Never   Vaping Use    Vaping status: Never Used   Substance and Sexual Activity    Alcohol use: Never    Drug use: Never    Sexual activity: Never       Review of Systems  Review of Systems   Psychiatric/Behavioral:  The  "patient is nervous/anxious.        Objective:     /58 (BP Location: Right arm, Patient Position: Sitting, Cuff Size: Large Adult)   Pulse 78   Ht 165.1 cm (65\")   Wt 72.8 kg (160 lb 9.6 oz)   SpO2 97%   BMI 26.73 kg/m²   Physical Exam  Constitutional:       General: He is not in acute distress.     Appearance: Normal appearance. He is not ill-appearing, toxic-appearing or diaphoretic.   Pulmonary:      Effort: Pulmonary effort is normal. No respiratory distress.   Neurological:      Mental Status: He is alert.   Psychiatric:         Mood and Affect: Mood normal.         Behavior: Behavior normal.          Assessment/Plan:     Diagnoses and all orders for this visit:    1. Needs flu shot (Primary)  -     Fluzone >6mos ([id number])    2. Potential exposure to STD    See my last clinic note for further details.  He had been potentially exposed to STD and had symptoms including fatigue and sore throat.  I had ordered a full panel and requested to follow-up in approximately 6 weeks.  Labs were negative and we reviewed them today.  Would recheck HIV panel today.  He tells me symptoms have resolved so if this is negative, no further workup would be indicated.    -     HIV-1/O/2 Ag/Ab w Reflex; Future                Follow-up:     Return in about 6 months (around 5/12/2025) for Annual physical.    Preventative:  Health Maintenance   Topic Date Due    ANNUAL PHYSICAL  02/17/2024    INFLUENZA VACCINE  08/01/2024    COVID-19 Vaccine (1 - 2024-25 season) 02/01/2025 (Originally 9/1/2024)    TDAP/TD VACCINES (1 - Tdap) 11/12/2025 (Originally 4/10/2018)    BMI FOLLOWUP  09/30/2025    HEPATITIS C SCREENING  Completed    Pneumococcal Vaccine 0-64  Aged Out           This document has been electronically signed by Ti Duarte MD on November 12, 2024 13:27 EST       Parts of this note are electronic transcriptions/translations of spoken language to printed text using the Dragon Dictation system.   "

## 2024-11-13 ENCOUNTER — TELEPHONE (OUTPATIENT)
Dept: FAMILY MEDICINE CLINIC | Facility: CLINIC | Age: 24
End: 2024-11-13
Payer: COMMERCIAL

## 2024-11-13 NOTE — TELEPHONE ENCOUNTER
Caller: Trav Leach    Relationship: Self    Best call back number: [phone]    What test was performed: STD/HIV TEST     When was the test performed: 11.12.24    Where was the test performed: [facility]

## 2024-11-14 NOTE — PROGRESS NOTES
HIV testing is negative.    Thank you,    Ti Duarte     ?  This document has been electronically signed by Ti Duarte MD on November 13, 2024 21:12 EST

## 2024-12-08 NOTE — PROGRESS NOTES
"[facility]  Initial Evaluation    Referring Provider:  Ti Duarte MD  [address]  [address]  [place] [address]    Chief Complaint: \"I scheduled as appointment a while ago\"     History of Present Illness: Trav Salmeron is a guarded  24 y.o. male who presents today for initial evaluation regarding psychiatric consultation. Trav presents unaccompanied in no acute distress and engages with me appropriately. Psychotropic regimen with which patient presents is described as nothing.     History is positive for signs/symptoms suggestive of low mood, low energy, anhedonia, changes in sleep, changes in appetite, guilt, poor concentration, psychomotor changes, thoughts of being better off dead. \"I have been working out and that has helped my mood.\" \"I eat more when my mood is low.\" \"I have been focusing on eating healthier.\" \"I have some guilt there when I was not there as my mom was struggling.\"     He complains of consistent and excessive worry across several domains of life that contributes to tension and irritability throughout the day. 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     I have counseled the patient with regard to diagnoses and the recommended treatment regimen as documented below: I will assume prescriptive responsibility for to be determined. Trav is not interested in medications at this time. Patient is hesitant with regard to use of medications for symptom management; I have counseled then in this regard and will work to establish rapport to facilitate treatment.  He would prefer to use CBD/THC but he has his Cdl and is at risk for drug testing. Recommended magnesium OTC for it's calming effects. Recess seltzers or packets " "may be beneficial, these can be bought  Recommend patient look at getting more sleep to aid in reduction of anxiety and irritability.     Patient acknowledges the diagnoses per my rendered interpretation. Patient demonstrates awareness/understanding of viable alternatives for treatment as well as potential risks, benefits, and side effects associated with this regimen and is amenable to proceed in this fashion.     Recommended lifestyle changes: mood diary (provided) limit caffeine use  Psychiatric History:  Diagnoses: none  Outpatient history: none  Inpatient history: none  Medication trials: none  Other treatment modalities: none  Self harm: none  Suicide attempts: none  Abuse or neglect: none    Substance Use History:   Types/methods/frequency: *none  Transtheoretical stage: none  Etoh-drink \"quite a few\" Regan lights-24 pack, last time 3 weekends ago  Social History:  Residence: lives house, my parents and sister, 16, other sister in college, has a [employer]   Vocation: -2:30 am to 11 am  Source of income: employed  Last grade completed: 12th   Pertinent developmental history: attention issues, started working at a young age, IEP-reading in middle school   Pertinent legal history: none  Hobbies/interests: started watching anime, going to gym (for depression).  \"I get to be myself in there\" does cardio  [employer]ybeterian: beliefs but does not attend Jainism   Exercise: runs on treadmill-5 times a week, goes with a cousin   Dietary habits: no pertinent issues   Sleep hygiene: poor, playing or watching anime and lee, maybe 3-4 hours  Social habits: hangs around cousin (not gym cousin) top golf-mini golf  Sunlight: There are no concern for under-exposure.  Caffeine intake: recently stopped drinking Redbulls  Hydration habits: drinking more water   history: No    Social History     Socioeconomic History    Marital status: Single   Tobacco Use    Smoking status: Never     Passive exposure: Never "    Smokeless tobacco: Never   Vaping Use    Vaping status: Never Used   Substance and Sexual Activity    Alcohol use: Never    Drug use: Never    Sexual activity: Never     Access to Firearms: no    Tobacco use counseling/intervention: N/A, patient does not use tobacco  PHQ-9 Depression Screening  PHQ-9 Total Score: 11    Little interest or pleasure in doing things? Several days   Feeling down, depressed, or hopeless? Over half   PHQ-2 Total Score 3   Trouble falling or staying asleep, or sleeping too much? Over half   Feeling tired or having little energy? Several days   Poor appetite or overeating? Several days   Feeling bad about yourself - or that you are a failure or have let yourself or your family down? Over half   Trouble concentrating on things, such as reading the newspaper or watching television? Over half   Moving or speaking so slowly that other people could have noticed? Or the opposite - being so fidgety or restless that you have been moving around a lot more than usual? Not at all   Thoughts that you would be better off dead, or of hurting yourself in some way? Not at all   PHQ-9 Total Score 11   If you checked off any problems, how difficult have these problems made it for you to do your work, take care of things at home, or get along with other people? Somewhat difficult       RAF-7  Feeling nervous, anxious or on edge: More than half the days  Not being able to stop or control worrying: More than half the days  Worrying too much about different things: More than half the days  Trouble Relaxing: Several days  Being so restless that it is hard to sit still: Several days  Feeling afraid as if something awful might happen: Several days  Becoming easily annoyed or irritable: More than half the days  RAF 7 Total Score: 11  If you checked any problems, how difficult have these problems made it for you to do your work, take care of things at home, or get along with other people: Somewhat  "difficult    Problem List:  There is no problem list on file for this patient.    Allergy:   No Known Allergies     Discontinued Medications:  There are no discontinued medications.    Current Medications:   Current Outpatient Medications   Medication Sig Dispense Refill    erythromycin (ROMYCIN) 5 MG/GM ophthalmic ointment Apply every 4 hours for the first 48 hours then go to every 6 hours for 5 more days (Patient not taking: Reported on 9/30/2024) 1 g 0    valACYclovir (VALTREX) 500 MG tablet Take 1 tablet by mouth Daily. 10 tablet 3     No current facility-administered medications for this visit.     Past Medical History:  Past Medical History:   Diagnosis Date    Allergies      Past Surgical History:  History reviewed. No pertinent surgical history.  Family History:   History reviewed. No pertinent family history.    Mental Status Exam:   Observations:  Appearance: Neat  Speech: Normal  Eye Contact: Avoidant  Motor Activity: Normal  Affect:Full  Comments:  Mood:Mood: Euthymic  Cognition  Orientation Impairment: None  Memory Impairment: None  Attention: Normal  Comments:  Perception  Hallucinations:None  Other:   Comments:  Thoughts:  Suicidality:None  Homicidality:None  Delusions:  None  Comments:  Behavior:Behavior: Cooperative  Insight: Insight: Good  Judgement:Insight: Good    Vital Signs:   /61   Pulse 63   Ht 165.1 cm (65\")   Wt 71.2 kg (157 lb)   SpO2 94%   BMI 26.13 kg/m²    Lab Results:   Lab on 11/12/2024   Component Date Value Ref Range Status    HIV DUO 11/12/2024 Non-Reactive  Non-Reactive Final   Lab on 09/30/2024   Component Date Value Ref Range Status    Monospot 09/30/2024 Negative  Negative Final    HIV DUO 09/30/2024 Non-Reactive  Non-Reactive Final    RPR 09/30/2024 Non-Reactive  Non-Reactive Final    Hepatitis C Ab 09/30/2024 Non-Reactive  Non-Reactive Final   Office Visit on 09/30/2024   Component Date Value Ref Range Status    SARS Antigen 09/30/2024 Not Detected  Not Detected, " Presumptive Negative Final    Influenza A Antigen DASHA 09/30/2024 Not Detected  Not Detected Final    Influenza B Antigen DASHA 09/30/2024 Not Detected  Not Detected Final    Internal Control 09/30/2024 Passed  Passed Final    Lot Number 09/30/2024 [id number]   Final    Expiration Date 09/30/2024 10,232,025   Final    Rapid Strep A Screen 09/30/2024 Negative  Negative, VALID, INVALID, Not Performed Final    Internal Control 09/30/2024 Passed  Passed Final    Lot Number 09/30/2024 [id number]   Final    Expiration Date 09/30/2024 2,[id number]   Final       ASSESSMENT AND PLAN:    ICD-10-CM ICD-9-CM   1. MDD (major depressive disorder), recurrent episode, moderate  F33.1 296.32   2. Generalized anxiety disorder  F41.1 300.02   3. Irritable behavior  R45.4 799.22       Trav who presents today for initial evaluation regarding psychiatric consultation. We have discussed the history and interpreted diagnoses as above as well as the treatment plan below, including potential R/B/SE of the recommended regimen of which the patient demonstrates understanding. Patient is agreeable to call 911 or go to the nearest ER should he become concerned for his own safety and/or the safety of those around him. There are not overt indices of acute hilario/psychosis on evaluation today.     Medication regimen: nothing per patient; patient is advised not to misuse prescribed medications or to use any exogenous substances that aren't disclosed to this provider as they may interact with the regimen to his detriment.   Risk Assessment: protracted risk is moderate, imminent risk is moderate..  Risk factors include:anxiety disorder, mood disorder, and recent/ongoing psychosocial stressors. Protective factors include:intact reality testing, no substance use disorder, no access to firearms, no present SI, no stated history of suicide attempts or self-harm, patient's exhibited future-orientation, strong social support, and patient's cooperation with care. Do  note that this is subject to change with the Buddhism of new stressors, treatment non-adherence, use of substances, and/or new medical ails.  Monitoring: reviewed labs/imaging as populated above,  PHQ-9 today is PHQ-9 Total Score: 11 /27, RAF-7 today is 11/21  Therapy: referred to [facility]  [phone]   Follow-up:6-8 weeks  Communications: N/A    TREATMENT PLAN/GOALS: challenge patterns of living conducive to symptom burden, implement recommended regimen as above with augmentative, intermittent supportive psychotherapy to reduce symptom burden. Patient acknowledged and verbally consented to begin treatment as above. The importance of adherence to the recommended treatment and interval follow-up appointments was emphasized today. Patient was today advised to limit daily caffeine intake, hydrate appropriately, eat healthy and nutritious foods, engage sleep hygiene measures, engage appropriate exposure to sunlight, engage with hobbies in balance with life necessities, and exercise appropriate to their capacity to do so.     Billing: I have seen the patient today and considered his psychiatric complaints, rendered a diagnosis, and discussed treatment with the patient as above with which he consents.    Parts of this note are electronic transcriptions/translations of spoken language to printed text using the Dragon Dictation system.    Electronically signed by WILLIE Azul, 12/07/24,

## 2024-12-09 ENCOUNTER — OFFICE VISIT (OUTPATIENT)
Dept: PSYCHIATRY | Facility: CLINIC | Age: 24
End: 2024-12-09
Payer: COMMERCIAL

## 2024-12-09 VITALS
DIASTOLIC BLOOD PRESSURE: 61 MMHG | WEIGHT: 157 LBS | HEART RATE: 63 BPM | OXYGEN SATURATION: 94 % | BODY MASS INDEX: 26.16 KG/M2 | HEIGHT: 65 IN | SYSTOLIC BLOOD PRESSURE: 131 MMHG

## 2024-12-09 DIAGNOSIS — F33.1 MDD (MAJOR DEPRESSIVE DISORDER), RECURRENT EPISODE, MODERATE: Primary | ICD-10-CM

## 2024-12-09 DIAGNOSIS — R45.4 IRRITABLE BEHAVIOR: ICD-10-CM

## 2024-12-09 DIAGNOSIS — F41.1 GENERALIZED ANXIETY DISORDER: ICD-10-CM

## 2024-12-09 PROCEDURE — 90792 PSYCH DIAG EVAL W/MED SRVCS: CPT

## 2024-12-10 ENCOUNTER — TELEPHONE (OUTPATIENT)
Dept: PSYCHIATRY | Facility: CLINIC | Age: 24
End: 2024-12-10
Payer: COMMERCIAL

## 2025-02-11 NOTE — PROGRESS NOTES
"[facility]  Follow-up Visit    Chief Complaint: \"I scheduled as appointment a while ago\"      History of Present Illness: Trav Salmeron is a guarded  24 y.o. male who presents today for initial evaluation regarding psychiatric consultation. Trav presents unaccompanied in no acute distress and engages with me appropriately. Psychotropic regimen with which patient presents is described as nothing.      History is positive for signs/symptoms suggestive of low mood, low energy, anhedonia, changes in sleep, changes in appetite, guilt, poor concentration, psychomotor changes, thoughts of being better off dead. \"I have been working out and that has helped my mood.\" \"I eat more when my mood is low.\" \"I have been focusing on eating healthier.\" \"I have some guilt there when I was not there as my mom was struggling.\"      He complains of consistent and excessive worry across several domains of life that contributes to tension and irritability throughout the day. 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      I have counseled the patient with regard to diagnoses and the recommended treatment regimen as documented below: I will assume prescriptive responsibility for to be determined. Trav is not interested in medications at this time. Patient is hesitant with regard to use of medications for symptom management; I have counseled then in this regard and will work to establish rapport to facilitate treatment.  He would prefer to use CBD/THC but he has his Cdl and is at risk for drug testing. Recommended magnesium OTC for it's calming effects. Recess seltzers or packets may be beneficial, these can be bought  Recommend patient look at getting more sleep to aid " "in reduction of anxiety and irritability.      Patient acknowledges the diagnoses per my rendered interpretation. Patient demonstrates awareness/understanding of viable alternatives for treatment as well as potential risks, benefits, and side effects associated with this regimen and is amenable to proceed in this fashion.      Recommended lifestyle changes: mood diary (provided) limit caffeine use  Psychiatric History:  Diagnoses: none  Outpatient history: none  Inpatient history: none  Medication trials: none  Other treatment modalities: none  Self harm: none  Suicide attempts: none  Abuse or neglect: none     Substance Use History:   Types/methods/frequency: *none  Transtheoretical stage: none  Etoh-drink \"quite a few\" Regan lights-24 pack, last time 3 weekends ago  Social History:  Residence: lives house, my parents and sister, 16, other sister in college, has a [employer]   Vocation: -2:30 am to 11 am  Source of income: employed  Last grade completed: 12th   Pertinent developmental history: attention issues, started working at a young age, IEP-reading in middle school   Pertinent legal history: none  Hobbies/interests: started watching anime, going to gym (for depression).  \"I get to be myself in there\" does cardio  [employer]: beliefs but does not attend [employer]   Exercise: runs on treadmill-5 times a week, goes with a cousin   Dietary habits: no pertinent issues   Sleep hygiene: poor, playing or watching anime and lee, maybe 3-4 hours  Social habits: hangs around cousin (not gym cousin) top golf-mini golf  Sunlight: There are no concern for under-exposure.  Caffeine intake: recently stopped drinking Redbulls  Hydration habits: drinking more water   history: No    Interval History Trav is a 24 y.o. male who presents today for follow-up. Trav presents unaccompanied in no acute distress and engages with me appropriately.   Current treatment regimen includes:   -No current " medications  Side-effects per given history: none.      Today the patient feels better-he had cutting down on drinking and did not drink the whole month of January. He has been working out and that has helped his mood. He has been attending [employer] and doing things to help improve his mindset. He voices overall better outlook with the induction of these healthy habits.   Thought process and content are devoid of overt aberration suggestive of acute hilario/psychosis. The patient denies SI/HI/AVH. There are changes on exam today compared to most recent evaluation.    - sleep: no concerns  - appetite: moderately controlled    Patient is not interested in medication at this time. Referred out to therapy. Advised patient to come back as needed and I would be happy to assist.   I have counseled the patient with regard to diagnoses and the recommended treatment regimen as documented below. Patient acknowledges the diagnoses per my rendered interpretation. Patient demonstrates understanding of potential risks/benefits/side effects associated with this regimen and is amenable to proceed in this fashion.       Social History     Socioeconomic History    Marital status: Single   Tobacco Use    Smoking status: Never     Passive exposure: Never    Smokeless tobacco: Never   Vaping Use    Vaping status: Never Used   Substance and Sexual Activity    Alcohol use: Yes     Comment: once a month    Drug use: Never    Sexual activity: Never       Tobacco use counseling/intervention: patient does not use tobacco.   Problem List:  There is no problem list on file for this patient.    Allergy:   No Known Allergies     Discontinued Medications:  There are no discontinued medications.    Current Medications:   Current Outpatient Medications   Medication Sig Dispense Refill    valACYclovir (VALTREX) 500 MG tablet Take 1 tablet by mouth Daily. 10 tablet 3     No current facility-administered medications for this visit.     Past Medical  "History:  Past Medical History:   Diagnosis Date    Allergies      Past Surgical History:  No past surgical history on file.    MENTAL STATUS EXAM   General Appearance:  Cleanly groomed and dressed and well developed  Eye Contact:  Good eye contact  Attitude:  Cooperative, polite and candid  Motor Activity:  Normal gait, posture  Muscle Strength:  Normal  Speech:  Normal rate, tone, volume  Language:  Spontaneous  Mood and affect:  Normal, pleasant  Thought Process:  Logical  Associations/ Thought Content:  No delusions  Hallucinations:  None  Suicidal Ideations:  Not present  Homicidal Ideation:  Not present  Sensorium:  Alert and clear  Orientation:  Person, place, time and situation  Immediate Recall, Recent, and Remote Memory:  Intact  Attention Span/ Concentration:  Good  Fund of Knowledge:  Appropriate for age and educational level  Intellectual Functioning:  Average range  Insight:  Good  Judgement:  Good  Reliability:  Good  Impulse Control:  Good      Vital Signs:   /54   Pulse 67   Ht 165.1 cm (65\")   Wt 67 kg (147 lb 11.2 oz)   BMI 24.58 kg/m²    Lab Results:   Lab on 11/12/2024   Component Date Value Ref Range Status    HIV DUO 11/12/2024 Non-Reactive  Non-Reactive Final   Lab on 09/30/2024   Component Date Value Ref Range Status    Monospot 09/30/2024 Negative  Negative Final    HIV DUO 09/30/2024 Non-Reactive  Non-Reactive Final    RPR 09/30/2024 Non-Reactive  Non-Reactive Final    Hepatitis C Ab 09/30/2024 Non-Reactive  Non-Reactive Final   Office Visit on 09/30/2024   Component Date Value Ref Range Status    SARS Antigen 09/30/2024 Not Detected  Not Detected, Presumptive Negative Final    Influenza A Antigen DASHA 09/30/2024 Not Detected  Not Detected Final    Influenza B Antigen DASHA 09/30/2024 Not Detected  Not Detected Final    Internal Control 09/30/2024 Passed  Passed Final    Lot Number 09/30/2024 [id number]   Final    Expiration Date 09/30/2024 [id number]   Final    Rapid Strep A Screen " 09/30/2024 Negative  Negative, VALID, INVALID, Not Performed Final    Internal Control 09/30/2024 Passed  Passed Final    Lot Number 09/30/2024 [id number]   Final    Expiration Date 09/30/2024 [id number]   Final       ASSESSMENT AND PLAN:    ICD-10-CM ICD-9-CM   1. MDD (major depressive disorder), recurrent episode, moderate  F33.1 296.32   2. Generalized anxiety disorder  F41.1 300.02   3. Irritable behavior  R45.4 799.22       Trav is a 24 y.o. male who presents today for follow-up regarding brief follow up. We have discussed the interval history and the treatment plan below, including potential R/B/SE of the recommended regimen of which the patient demonstrates understanding. Patient is agreeable to call 911 or go to the nearest ER should he become concerned for his own safety and/or the safety of those around him. There are are no overt indices of acute hilario/psychosis on exam today. MARY reviewed and is as expected.    Medication regimen: no medications per patient request; patient is advised not to misuse prescribed medications or to use them with any exogenous substances that aren't disclosed to this provider as they may interact with the regimen to the patient's detriment.   Risk Assessment: protracted risk is mdoerate, imminent risk is low. Do note that this is subject to change with the Cheondoism of new stressors, treatment non-adherence, use of substances, and/or new medical ails.   Monitoring: reviewed labs/imaging as populated above; ordered  Therapy: referred to [facility] [phone]  Follow-up: as needed  Communications: N/A    TREATMENT PLAN/GOALS: challenge patterns of living conducive to symptom burden, implement recommended regimen as above with augmentative, intermittent supportive psychotherapy to reduce symptom burden. Patient acknowledged and verbally consented to continue treatment. The importance of adherence to the recommended treatment and interval follow-up appointments was again  emphasized today: patient has good treatment adherence per given history. Patient was today reminded to limit daily caffeine intake, hydrate appropriately, eat healthy and nutritious foods, engage sleep hygiene measures, engage appropriate exposure to sunlight, engage with hobbies in balance with life necessities, and exercise appropriate to their capacity to do so.       Parts of this note are electronic transcriptions/translations of spoken language to printed text using the Dragon Dictation system.    Electronically signed by WILLIE Azul, 02/11/25

## 2025-02-12 ENCOUNTER — OFFICE VISIT (OUTPATIENT)
Dept: PSYCHIATRY | Facility: CLINIC | Age: 25
End: 2025-02-12
Payer: COMMERCIAL

## 2025-02-12 VITALS
HEIGHT: 65 IN | SYSTOLIC BLOOD PRESSURE: 129 MMHG | BODY MASS INDEX: 24.61 KG/M2 | HEART RATE: 67 BPM | DIASTOLIC BLOOD PRESSURE: 54 MMHG | WEIGHT: 147.7 LBS

## 2025-02-12 DIAGNOSIS — F33.1 MDD (MAJOR DEPRESSIVE DISORDER), RECURRENT EPISODE, MODERATE: Primary | ICD-10-CM

## 2025-02-12 DIAGNOSIS — F41.1 GENERALIZED ANXIETY DISORDER: ICD-10-CM

## 2025-02-12 DIAGNOSIS — R45.4 IRRITABLE BEHAVIOR: ICD-10-CM

## 2025-03-21 ENCOUNTER — TELEPHONE (OUTPATIENT)
Dept: PSYCHIATRY | Facility: CLINIC | Age: 25
End: 2025-03-21
Payer: COMMERCIAL

## 2025-05-12 ENCOUNTER — LAB (OUTPATIENT)
Dept: LAB | Facility: HOSPITAL | Age: 25
End: 2025-05-12
Payer: COMMERCIAL

## 2025-05-12 ENCOUNTER — OFFICE VISIT (OUTPATIENT)
Dept: FAMILY MEDICINE CLINIC | Facility: CLINIC | Age: 25
End: 2025-05-12
Payer: COMMERCIAL

## 2025-05-12 VITALS
HEIGHT: 65 IN | OXYGEN SATURATION: 98 % | BODY MASS INDEX: 22.99 KG/M2 | HEART RATE: 60 BPM | DIASTOLIC BLOOD PRESSURE: 51 MMHG | SYSTOLIC BLOOD PRESSURE: 107 MMHG | WEIGHT: 138 LBS

## 2025-05-12 DIAGNOSIS — Z00.00 ANNUAL PHYSICAL EXAM: Primary | ICD-10-CM

## 2025-05-12 DIAGNOSIS — Z00.00 ANNUAL PHYSICAL EXAM: ICD-10-CM

## 2025-05-12 LAB
ALBUMIN SERPL-MCNC: 4.7 G/DL (ref 3.5–5.2)
ALBUMIN/GLOB SERPL: 1.8 G/DL
ALP SERPL-CCNC: 101 U/L (ref 39–117)
ALT SERPL W P-5'-P-CCNC: 16 U/L (ref 1–41)
ANION GAP SERPL CALCULATED.3IONS-SCNC: 9.1 MMOL/L (ref 5–15)
AST SERPL-CCNC: 21 U/L (ref 1–40)
BILIRUB SERPL-MCNC: 0.5 MG/DL (ref 0–1.2)
BUN SERPL-MCNC: 14 MG/DL (ref 6–20)
BUN/CREAT SERPL: 15.2 (ref 7–25)
CALCIUM SPEC-SCNC: 9.4 MG/DL (ref 8.6–10.5)
CHLORIDE SERPL-SCNC: 104 MMOL/L (ref 98–107)
CO2 SERPL-SCNC: 25.9 MMOL/L (ref 22–29)
CREAT SERPL-MCNC: 0.92 MG/DL (ref 0.76–1.27)
DEPRECATED RDW RBC AUTO: 39.9 FL (ref 37–54)
EGFRCR SERPLBLD CKD-EPI 2021: 117.7 ML/MIN/1.73
ERYTHROCYTE [DISTWIDTH] IN BLOOD BY AUTOMATED COUNT: 12.2 % (ref 12.3–15.4)
GLOBULIN UR ELPH-MCNC: 2.6 GM/DL
GLUCOSE SERPL-MCNC: 87 MG/DL (ref 65–99)
HCT VFR BLD AUTO: 41.6 % (ref 37.5–51)
HGB BLD-MCNC: 14.2 G/DL (ref 13–17.7)
MCH RBC QN AUTO: 30.9 PG (ref 26.6–33)
MCHC RBC AUTO-ENTMCNC: 34.1 G/DL (ref 31.5–35.7)
MCV RBC AUTO: 90.4 FL (ref 79–97)
PLATELET # BLD AUTO: 221 10*3/MM3 (ref 140–450)
PMV BLD AUTO: 11.9 FL (ref 6–12)
POTASSIUM SERPL-SCNC: 4.1 MMOL/L (ref 3.5–5.2)
PROT SERPL-MCNC: 7.3 G/DL (ref 6–8.5)
RBC # BLD AUTO: 4.6 10*6/MM3 (ref 4.14–5.8)
SODIUM SERPL-SCNC: 139 MMOL/L (ref 136–145)
WBC NRBC COR # BLD AUTO: 7.98 10*3/MM3 (ref 3.4–10.8)

## 2025-05-12 PROCEDURE — 36415 COLL VENOUS BLD VENIPUNCTURE: CPT

## 2025-05-12 PROCEDURE — 85027 COMPLETE CBC AUTOMATED: CPT

## 2025-05-12 PROCEDURE — 80053 COMPREHEN METABOLIC PANEL: CPT

## 2025-05-12 NOTE — PROGRESS NOTES
"    Subjective:       Trav Salmeron is a 25 y.o. male who presents for annual physical.    Mr. Salmeron is in excellent health.  He has no serious chronic medical conditions.  He takes Valtrex as needed as needed for HSV flares but takes no active everyday medications.  Vital signs are excellent in blood pressure, heart rate, oxygen, and weight are all within normal limits.  We will recheck his CBC and CMP today and continue to follow-up at yearly intervals.      Past Medical Hx:  Past Medical History:   Diagnosis Date    Allergies        Past Surgical Hx:  History reviewed. No pertinent surgical history.    Current Meds:    Current Outpatient Medications:     valACYclovir (VALTREX) 500 MG tablet, Take 1 tablet by mouth Daily., Disp: 10 tablet, Rfl: 3    Allergies:  No Known Allergies    Family Hx:  History reviewed. No pertinent family history.     Social History:  Social History     Socioeconomic History    Marital status: Single   Tobacco Use    Smoking status: Never     Passive exposure: Never    Smokeless tobacco: Never   Vaping Use    Vaping status: Never Used   Substance and Sexual Activity    Alcohol use: Yes     Comment: once a month    Drug use: Never    Sexual activity: Never       Review of Systems  Review of Systems   Respiratory:  Negative for shortness of breath and wheezing.    Cardiovascular:  Negative for chest pain and palpitations.   Gastrointestinal:  Negative for nausea and vomiting.   Endocrine: Negative for polydipsia and polyuria.   Genitourinary:  Negative for difficulty urinating and hematuria.       Objective:     /51 (BP Location: Left arm, Patient Position: Sitting, Cuff Size: Adult)   Pulse 60   Ht 165.1 cm (65\")   Wt 62.6 kg (138 lb)   SpO2 98%   BMI 22.96 kg/m²   Physical Exam  Constitutional:       General: He is not in acute distress.     Appearance: Normal appearance. He is normal weight. He is not ill-appearing, toxic-appearing or diaphoretic.   Cardiovascular: "      Rate and Rhythm: Normal rate.   Pulmonary:      Effort: Pulmonary effort is normal.      Breath sounds: Normal breath sounds.   Neurological:      Mental Status: He is alert.   Psychiatric:         Mood and Affect: Mood normal.         Behavior: Behavior normal.          Assessment/Plan:     Diagnoses and all orders for this visit:    1. Annual physical exam (Primary)    Mr. Salmeron is in excellent health.  He has no serious chronic medical conditions.  He takes Valtrex as needed as needed for HSV flares but takes no active everyday medications.  Vital signs are excellent in blood pressure, heart rate, oxygen, and weight are all within normal limits.  We will recheck his CBC and CMP today and continue to follow-up at yearly intervals.    -     CBC No Differential; Future  -     Comprehensive metabolic panel; Future        Follow-up:     Return in about 1 year (around 5/12/2026) for Annual physical.    Preventative:  Health Maintenance   Topic Date Due    COVID-19 Vaccine (1 - 2024-25 season) 05/26/2025 (Originally 9/1/2024)    TDAP/TD VACCINES (1 - Tdap) 11/12/2025 (Originally 4/10/2018)    INFLUENZA VACCINE  07/01/2025    ANNUAL PHYSICAL  05/12/2026    HEPATITIS C SCREENING  Completed    Pneumococcal Vaccine 0-49  Aged Out           This document has been electronically signed by Ti Duarte MD on May 12, 2025 15:45 EDT       Parts of this note are electronic transcriptions/translations of spoken language to printed text using the Dragon Dictation system.

## 2025-06-10 ENCOUNTER — TELEPHONE (OUTPATIENT)
Dept: ORTHOPEDIC SURGERY | Facility: CLINIC | Age: 25
End: 2025-06-10
Payer: COMMERCIAL

## 2025-06-10 NOTE — TELEPHONE ENCOUNTER
1.Longitudinally oriented fracture through the proximal radial head with intra-articular extension into the radiocapitellar joint.  2.Large elbow joint effusion.   XRAY 6-9

## 2025-06-16 ENCOUNTER — OFFICE VISIT (OUTPATIENT)
Dept: ORTHOPEDIC SURGERY | Facility: CLINIC | Age: 25
End: 2025-06-16
Payer: COMMERCIAL

## 2025-06-16 VITALS
OXYGEN SATURATION: 98 % | HEART RATE: 77 BPM | HEIGHT: 65 IN | SYSTOLIC BLOOD PRESSURE: 135 MMHG | WEIGHT: 138 LBS | DIASTOLIC BLOOD PRESSURE: 75 MMHG | BODY MASS INDEX: 22.99 KG/M2

## 2025-06-16 DIAGNOSIS — M25.521 RIGHT ELBOW PAIN: Primary | ICD-10-CM

## 2025-06-16 DIAGNOSIS — S52.134A CLOSED NONDISPLACED FRACTURE OF NECK OF RIGHT RADIUS, INITIAL ENCOUNTER: ICD-10-CM

## 2025-06-16 NOTE — PROGRESS NOTES
"Chief Complaint  Initial Evaluation of the Right Elbow     Subjective      Trav Salmeron presents to [facility] for initial evaluation of the right elbow.  Went to  on 6/9/25.  Patient states he fell while getting a piggyback ride 6/8/25 from a friend at a festival. He was placed in a splint and is here to review.  He had new X rays in office today.      No Known Allergies     Social History     Socioeconomic History    Marital status: Single   Tobacco Use    Smoking status: Never     Passive exposure: Never    Smokeless tobacco: Never   Vaping Use    Vaping status: Some Days    Substances: Nicotine   Substance and Sexual Activity    Alcohol use: Yes     Comment: once a month    Drug use: Never    Sexual activity: Never        I reviewed the patient's chief complaint, history of present illness, review of systems, past medical history, surgical history, family history, social history, medications, and allergy list.     Review of Systems     Constitutional: Denies fevers, chills, weight loss  Cardiovascular: Denies chest pain, shortness of breath  Skin: Denies rashes, acute skin changes  Neurologic: Denies headache, loss of consciousness        Vital Signs:   /75   Pulse 77   Ht 165.1 cm (65\")   Wt 62.6 kg (138 lb)   SpO2 98%   BMI 22.96 kg/m²          Physical Exam  General: Alert. No acute distress    Ortho Exam        RIGHT ELBOW  Sensation to light touch median, radial, ulnar nerve. Positive AIN, PIN, ulnar nerve motor function. Axillary sensation intact. Elbow ROM Mild swelling.   Pain over the radial head.         Procedures        Imaging Results (Most Recent)       Procedure Component Value Units Date/Time    XR Elbow 2 View Right - In process [[id number]] Resulted: 06/16/25 1332     Updated: 06/16/25 1337    This result has not been signed. Information might be incomplete.               Result Review :     X-Ray Report:  Right elbow X-Ray  Indication: " Evaluation of the right elbow  AP/Lateral view(s)  Findings:  fracture of the proximal radial head with routine alignment from previous imaging.    Prior studies available for comparison: yes       XR Elbow 3+ View Right  Result Date: 6/9/2025  Narrative: XR ELBOW 3+ VW RIGHT Date of Exam: 6/9/2025 5:48 PM EDT Indication: right elbow pain/swelling/decreased ROM after fall last night Comparison: None available. Findings: There is a longitudinally oriented fracture through the proximal radial head with intra-articular extension into the radiocapitellar joint. There is a large elbow joint effusion. Bone mineralization is normal. The ulnohumeral joint appears intact.     Impression: Impression: 1.Longitudinally oriented fracture through the proximal radial head with intra-articular extension into the radiocapitellar joint. 2.Large elbow joint effusion. Electronically Signed: Sacha Forrester MD  6/9/2025 5:57 PM EDT  Workstation ID: [id number]             Assessment and Plan     Diagnoses and all orders for this visit:    1. Right elbow pain (Primary)  -     XR Elbow 2 View Right    2. Closed nondisplaced fracture of neck of right radius, initial encounter        Discussed the treatment plan with the patient. I reviewed the X-rays that were obtained today with the patient.     Sling for comfort.  Come out for ROM of the elbow to work on full ROM.      If he is stiff at next visit will discuss options of physical therapy.      Work note given.        Educated on risk of smoking/nicotine. Discussed options for smoking cessation regarding chantix, nicorette gum and/ or to call the quit hotline at [phone]  and Call or return if worsening symptoms.    Follow Up     3 weeks with X ray at the next visit.        Patient was given instructions and counseling regarding his condition or for health maintenance advice. Please see specific information pulled into the AVS if appropriate.     Scribed for Lizandro Vieyra MD by  Юлия Seymour MA.  06/16/25   13:21 EDT      I have personally performed the services described in this document as scribed by the above individual and it is both accurate and complete. Lizandro Vieyra MD 06/16/25

## 2025-07-07 ENCOUNTER — OFFICE VISIT (OUTPATIENT)
Dept: ORTHOPEDIC SURGERY | Facility: CLINIC | Age: 25
End: 2025-07-07
Payer: COMMERCIAL

## 2025-07-07 VITALS
HEART RATE: 58 BPM | WEIGHT: 138 LBS | SYSTOLIC BLOOD PRESSURE: 121 MMHG | DIASTOLIC BLOOD PRESSURE: 67 MMHG | HEIGHT: 65 IN | BODY MASS INDEX: 22.99 KG/M2 | OXYGEN SATURATION: 95 %

## 2025-07-07 DIAGNOSIS — S52.131P: Primary | ICD-10-CM

## 2025-07-07 DIAGNOSIS — M25.521 RIGHT ELBOW PAIN: ICD-10-CM

## 2025-07-07 PROCEDURE — 99213 OFFICE O/P EST LOW 20 MIN: CPT

## 2025-07-07 NOTE — PROGRESS NOTES
"Chief Complaint  Follow-up of the Right Elbow    Subjective      Trav Salmeron presents to [facility]     History of Present Illness  The patient presents for a follow-up of his right elbow.  Patient had a fall and sustained a right radial neck fracture that we have been treating conservatively.    He reports an improvement in his elbow condition, with no swelling or pain. He has been able to lift up to 5 pounds without experiencing any discomfort. However, he notes that rotating his arm causes some pain along his elbow. He also mentions a decrease in the bruising on his elbow. He has been placed under work restrictions and is not currently driving. He recalls landing directly on his elbow during the incident.    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      No Known Allergies    Objective     Vital Signs:   Vitals:    07/07/25 1323   BP: 121/67   Pulse: 58   SpO2: 95%   Weight: 62.6 kg (138 lb)   Height: 165.1 cm (65\")     Body mass index is 22.96 kg/m².    I reviewed the patient's chief complaint, history of present illness, review of systems, past medical history, surgical history, family history, social history, medications, and allergy list.     Ortho Exam      Physical Exam  Musculoskeletal:  Right Elbow: Full flexion and extension, 90 degrees, supination and pronation.  Mild tenderness over the radial head.  No elbow swelling.            Imaging Results (Most Recent)       Procedure Component Value Units Date/Time    XR Elbow 2 View Right [[id number]] Resulted: 07/07/25 1408     Updated: 07/07/25 1408    Narrative:      X-Ray Report:  Study: X-rays ordered, taken in the office, and reviewed today  Site: Right elbow xray  Indication: Right radial head fracture follow-up  View: AP/Lateral view(s)  Findings: Increased displacement of longitudinal proximal radial head   fracture.   Prior studies available for comparison: yes  "             Results  Imaging   - X-ray of the elbow: 07/07/2025, Shows a fracture that is still healing.         Assessment and Plan   Diagnoses and all orders for this visit:    1. Closed displaced fracture of neck of right radius with malunion, subsequent encounter (Primary)    2. Right elbow pain  -     XR Elbow 2 View Right       Assessment & Plan  1. Elbow pain:  The elbow pain is associated with a fracture, as indicated by the x-ray findings. There is no increased pain with lifting 5 pounds, and no swelling is present.  Both of those situations are positive improvement of healing however x-rays indicate that there has been a worsening in the fracture itself.  The risk remains that the fracture could worsen if subjected to falls or excessive force, potentially leading to surgery. It is recommended to avoid heavy lifting and to minimize the use of the affected arm, essentially babying it for the next 2 weeks. The patient should perform most activities with the other arm. A follow-up x-ray will be conducted in 2 weeks to monitor healing. If the x-ray shows no changes or worsening, normal activities can be resumed.    Follow-up: The patient will follow up in 2 weeks.  Repeat elbow x-rays needed          Tobacco Use: Low Risk  (7/7/2025)    Patient History     Smoking Tobacco Use: Never     Smokeless Tobacco Use: Never     Passive Exposure: Never     Patient reports that they are a nonsmoker; cessation education not applicable.     BMI is within normal parameters. No other follow-up for BMI required.           Follow Up   Return in about 2 weeks (around 7/21/2025).  There are no Patient Instructions on file for this visit.    Patient was given instructions and counseling regarding his condition or for health maintenance advice. Please see specific information pulled into the AVS if appropriate.     Patient or patient representative verbalized consent for the use of Ambient Listening during the visit with  Mildred  WILLIE Senior for chart documentation. 7/7/2025  16:23 EDT    Dictated Utilizing Dragon Dictation. Please note that portions of this note were completed with a voice recognition program. Part of this note may be an electronic transcription/translation of spoken language to printed text using the Dragon Dictation System.

## 2025-07-21 ENCOUNTER — OFFICE VISIT (OUTPATIENT)
Dept: ORTHOPEDIC SURGERY | Facility: CLINIC | Age: 25
End: 2025-07-21
Payer: COMMERCIAL

## 2025-07-21 VITALS
BODY MASS INDEX: 22.99 KG/M2 | HEART RATE: 63 BPM | SYSTOLIC BLOOD PRESSURE: 114 MMHG | DIASTOLIC BLOOD PRESSURE: 63 MMHG | WEIGHT: 138 LBS | HEIGHT: 65 IN | OXYGEN SATURATION: 97 %

## 2025-07-21 DIAGNOSIS — S52.131P: Primary | ICD-10-CM

## 2025-07-21 PROCEDURE — 99213 OFFICE O/P EST LOW 20 MIN: CPT

## 2025-07-21 NOTE — PROGRESS NOTES
"Chief Complaint  Follow-up of the Right Elbow    Subjective      Trav Salmeron presents to [facility]     History of Present Illness  Patient is here today following up on his right elbow.  He sustained a right radial neck fracture they been treating conservatively. Initial injury occurred on 6/9/2025.    He reports an improvement in his condition, with no current pain or discomfort. He has been adhering to the advice given during his last visit, refraining from lifting weights for the past 2 weeks.  He denies any concerns.      No Known Allergies    Objective     Vital Signs:   Vitals:    07/21/25 1452   BP: 114/63   Pulse: 63   SpO2: 97%   Weight: 62.6 kg (138 lb)   Height: 165.1 cm (65\")     Body mass index is 22.96 kg/m².    I reviewed the patient's chief complaint, history of present illness, review of systems, past medical history, surgical history, family history, social history, medications, and allergy list.     Ortho Exam  Physical Exam  Musculoskeletal:  Elbow: Full range of motion without tenderness over the radial head.  No elbow swelling.  Full elbow flexion extension.  >90 degrees pronation supination.            Imaging Results (Most Recent)       Procedure Component Value Units Date/Time    XR Elbow 2 View Right [[id number]] Resulted: 07/21/25 1545     Updated: 07/21/25 1545    Narrative:      X-Ray Report:  Study: X-rays ordered, taken in the office, and reviewed today  Site: Right elbow xray  Indication: Right radial head fracture  View: AP/Lateral view(s)  Findings: Interval healing seen of the right radial head fracture  Prior studies available for comparison: yes              Results  Imaging   - X-ray of the arm: Fracture is showing signs of healing with haziness indicating more healing.         Assessment and Plan   Diagnoses and all orders for this visit:    1. Closed displaced fracture of neck of right radius with malunion, subsequent encounter " (Primary)  -     XR Elbow 2 View Right         Assessment & Plan  1.  Right radial head fracture:  The fracture is showing signs of healing, with no need for surgical intervention. Full range of motion in the elbow is present without tenderness over the radial head. The patient has been compliant with avoiding heavy lifting as advised.     He is advised to continue avoiding heavy lifting for another 2 weeks to allow further healing. Light activities can be resumed, but any weight-bearing or twisting motions should be monitored closely. A follow-up appointment is scheduled in 6 weeks for another x-ray to confirm complete healing. Patient education included the importance of monitoring activities and avoiding strain on the healing bone. Risks and benefits of resuming activities were discussed, emphasizing the need to avoid premature stress on the fracture site.    Follow-up: The patient will follow up in 6 weeks.  Right elbow x-rays needed      Tobacco Use: Low Risk  (7/21/2025)    Patient History     Smoking Tobacco Use: Never     Smokeless Tobacco Use: Never     Passive Exposure: Never     Patient reports that they are a nonsmoker; cessation education not applicable.     BMI is within normal parameters. No other follow-up for BMI required.           Follow Up   Return in about 6 weeks (around 9/1/2025).  There are no Patient Instructions on file for this visit.    Patient was given instructions and counseling regarding his condition or for health maintenance advice. Please see specific information pulled into the AVS if appropriate.     Patient or patient representative verbalized consent for the use of Ambient Listening during the visit with  WILLIE Basurto for chart documentation. 7/21/2025  16:45 EDT    Dictated Utilizing Dragon Dictation. Please note that portions of this note were completed with a voice recognition program. Part of this note may be an electronic transcription/translation of spoken  language to printed text using the Dragon Dictation System.